# Patient Record
(demographics unavailable — no encounter records)

---

## 2018-10-09 NOTE — ULT
OB ULTRASOUND COMPLETE:

 

Date:  10/09/18 

 

CLINICAL HISTORY:  

Evaluation of fetal anatomy. 

 

FINDINGS:

There is a single, live intrauterine gestation, which by sonographic evaluation corresponds to 21 wee
ks/3 days gestation, with BPD correlating to 21 weeks/1 day, head circumference 21 weeks/4 days, abdo
jeff circumference 21 weeks/6 days, and femur length 21 weeks/0 days. Fetal cardiac activity is docu
mented at 147 beats/minute. Amniotic fluid volume is subjectively normal. Placenta is primarily locat
ed in an anterior location and the fetus is demonstrated in a breech lie on the provided views. Evalu
ation of fetal anatomy reveals no significant pathology of the imaged calvarium, spine, four chamber 
heart, gastric bubble, kidneys, urinary bladder, or cord insertion site. AYESHA is calculated at 12.2 cm
. 

 

On the basis of sonographic imaging, estimated date of delivery is 02/16/2019. Estimated fetal weight
 is 423 gm. Cervical length is documented at 6.1 cm. 

 

IMPRESSION: 

Single, live intrauterine gestation, as discussed above. 

 

As clinically necessary, continued imaging follow-up may be obtained. 

 

 

POS: [facility]

## 2018-12-04 NOTE — PDOC.LDHP
Labor and Delivery H&P


Chief complaint: other (N/V)


HPI: 





34 y/o  at 30w0d, patient of Dr. Garay, presents with N/V since last 

night.  Patient reports that today she became dizzy and had some left sided 

pain so she decided to come in.  Denies VB, LOF, ctx or decreased FM.





ROS neg for HEENT, CV, pulm, GI, , neuro, psych, skin, musculoskeletal, or 

constitutional symptoms other than mentioned above.


OB History Details: 





1 prior term 


Current pregnancy complications: none


Past Medical History: 





None


Current medications: none


Previous surgical history: none


Allergies/Adverse Reactions: 


 Allergies











Allergy/AdvReac Type Severity Reaction Status Date / Time


 


No Known Allergies Allergy   Verified 18 12:02











Social history: none





- Physical Exam


Vital signs reviewed and normal: yes


General: NAD, resting


Lungs: nonlabored breathing


Abdomen: gravid


Extremeties: no edema


FHT: category 1 (130s, mod variability, + accels, no decels)


[place] contractions every: occasional





- Assessment





34 y/o  at 30w0d with gastroenteritis.  Patient reports symptoms have 

resolved with IV fluids and zofran.  Fetal status reassuring with reactive NST.





- Plan


-: 





D/c home with precautions.  Advised to keep all prenatal appointments and stay 

well hydrated.  Given Rx for Zofran.

## 2019-01-30 NOTE — PDOC.LDHP
Labor and Delivery H&P


Chief complaint: contractions, loss of fluid


HPI: 





32 y/o  at 38w1d, patient of Dr. Garay, presents with regular ctx since 

1200 and ?LOF.  Denies VB or decreased FM.  Has mild HA. No vision changes or 

RUQ pain.  Has history of CHTN prior to pregnancy and was on medication but was 

stopped at the beginning of pregnancy.  





ROS neg for HEENT, cv, pulm, gi, gu, neuro, psych, skin, musculoskeletal or 

constitutional sx other than mentioned above.


OB History Details: 





1 prior 


Current pregnancy complications: hypertension


Past Medical History: 





CHTN


Current medications: pre-marielena vitamins


Previous surgical history: none


Allergies/Adverse Reactions: 


 Allergies











Allergy/AdvReac Type Severity Reaction Status Date / Time


 


No Known Allergies Allergy   Verified 19 18:27











Social history: none





- Physical Exam


Abnormal vital signs: mild range BPs


General: NAD, resting


Lungs: nonlabored breathing


Abdomen: gravid


Extremeties: no edema


FHT: category 1 (130s, mod variability, + accels, no decels)


[place] contractions every: q 3-4 mins





- Vaginal Exam


cm dilated: 0


Effacement: 0%


Station: -3





- Assessment





 at 38w1d with mild range BPs at term and regular ctx.





- Plan


Plan: observation in L&D


-: 





Continue to monitor BPs


Repeat SVE as needed


Dr. Garay notified.